# Patient Record
Sex: MALE | Race: WHITE | NOT HISPANIC OR LATINO | Employment: FULL TIME | URBAN - METROPOLITAN AREA
[De-identification: names, ages, dates, MRNs, and addresses within clinical notes are randomized per-mention and may not be internally consistent; named-entity substitution may affect disease eponyms.]

---

## 2017-10-18 ENCOUNTER — HOSPITAL ENCOUNTER (EMERGENCY)
Facility: HOSPITAL | Age: 27
Discharge: HOME | End: 2017-10-18
Attending: EMERGENCY MEDICINE
Payer: MEDICARE

## 2017-10-18 ENCOUNTER — TELEPHONE (OUTPATIENT)
Dept: ORTHOPEDICS | Facility: CLINIC | Age: 27
End: 2017-10-18

## 2017-10-18 VITALS
TEMPERATURE: 98 F | SYSTOLIC BLOOD PRESSURE: 109 MMHG | BODY MASS INDEX: 19.89 KG/M2 | DIASTOLIC BLOOD PRESSURE: 62 MMHG | WEIGHT: 155 LBS | OXYGEN SATURATION: 100 % | HEIGHT: 74 IN | HEART RATE: 68 BPM | RESPIRATION RATE: 16 BRPM

## 2017-10-18 DIAGNOSIS — T78.40XA ALLERGIC REACTION, INITIAL ENCOUNTER: ICD-10-CM

## 2017-10-18 DIAGNOSIS — Z01.811 PRE-OP CHEST EXAM: ICD-10-CM

## 2017-10-18 DIAGNOSIS — S62.92XA CLOSED FRACTURE OF LEFT HAND, INITIAL ENCOUNTER: ICD-10-CM

## 2017-10-18 DIAGNOSIS — R21 RASH: Primary | ICD-10-CM

## 2017-10-18 LAB
ALBUMIN SERPL BCP-MCNC: 4.4 G/DL
ALP SERPL-CCNC: 74 U/L
ALT SERPL W/O P-5'-P-CCNC: 16 U/L
ANION GAP SERPL CALC-SCNC: 12 MMOL/L
AST SERPL-CCNC: 14 U/L
BASOPHILS # BLD AUTO: 0.06 K/UL
BASOPHILS NFR BLD: 0.6 %
BILIRUB SERPL-MCNC: 1.6 MG/DL
BILIRUB UR QL STRIP: NEGATIVE
BUN SERPL-MCNC: 12 MG/DL
CALCIUM SERPL-MCNC: 10.7 MG/DL
CHLORIDE SERPL-SCNC: 103 MMOL/L
CLARITY UR REFRACT.AUTO: CLEAR
CO2 SERPL-SCNC: 25 MMOL/L
COLOR UR AUTO: NORMAL
CREAT SERPL-MCNC: 0.9 MG/DL
DIFFERENTIAL METHOD: ABNORMAL
EOSINOPHIL # BLD AUTO: 0.3 K/UL
EOSINOPHIL NFR BLD: 3.2 %
ERYTHROCYTE [DISTWIDTH] IN BLOOD BY AUTOMATED COUNT: 12.5 %
EST. GFR  (AFRICAN AMERICAN): >60 ML/MIN/1.73 M^2
EST. GFR  (NON AFRICAN AMERICAN): >60 ML/MIN/1.73 M^2
GLUCOSE SERPL-MCNC: 110 MG/DL
GLUCOSE UR QL STRIP: NEGATIVE
HCT VFR BLD AUTO: 42.6 %
HGB BLD-MCNC: 15.3 G/DL
HGB UR QL STRIP: NEGATIVE
IMM GRANULOCYTES # BLD AUTO: 0.01 K/UL
IMM GRANULOCYTES NFR BLD AUTO: 0.1 %
KETONES UR QL STRIP: NEGATIVE
LEUKOCYTE ESTERASE UR QL STRIP: NEGATIVE
LYMPHOCYTES # BLD AUTO: 2.4 K/UL
LYMPHOCYTES NFR BLD: 22.5 %
MCH RBC QN AUTO: 31.3 PG
MCHC RBC AUTO-ENTMCNC: 35.9 G/DL
MCV RBC AUTO: 87 FL
MONOCYTES # BLD AUTO: 0.9 K/UL
MONOCYTES NFR BLD: 8.8 %
NEUTROPHILS # BLD AUTO: 6.8 K/UL
NEUTROPHILS NFR BLD: 64.8 %
NITRITE UR QL STRIP: NEGATIVE
NRBC BLD-RTO: 0 /100 WBC
PH UR STRIP: 8 [PH] (ref 5–8)
PLATELET # BLD AUTO: 272 K/UL
PMV BLD AUTO: 9.2 FL
POTASSIUM SERPL-SCNC: 4 MMOL/L
PROT SERPL-MCNC: 8.6 G/DL
PROT UR QL STRIP: NEGATIVE
RBC # BLD AUTO: 4.89 M/UL
SODIUM SERPL-SCNC: 140 MMOL/L
SP GR UR STRIP: 1.01 (ref 1–1.03)
URN SPEC COLLECT METH UR: NORMAL
UROBILINOGEN UR STRIP-ACNC: NEGATIVE EU/DL
WBC # BLD AUTO: 10.54 K/UL

## 2017-10-18 PROCEDURE — 81003 URINALYSIS AUTO W/O SCOPE: CPT

## 2017-10-18 PROCEDURE — 93010 ELECTROCARDIOGRAM REPORT: CPT | Mod: ,,, | Performed by: INTERNAL MEDICINE

## 2017-10-18 PROCEDURE — 99285 EMERGENCY DEPT VISIT HI MDM: CPT | Mod: ,,, | Performed by: EMERGENCY MEDICINE

## 2017-10-18 PROCEDURE — 63600175 PHARM REV CODE 636 W HCPCS: Performed by: EMERGENCY MEDICINE

## 2017-10-18 PROCEDURE — 25000003 PHARM REV CODE 250: Performed by: ORTHOPAEDIC SURGERY

## 2017-10-18 PROCEDURE — 96372 THER/PROPH/DIAG INJ SC/IM: CPT

## 2017-10-18 PROCEDURE — 99284 EMERGENCY DEPT VISIT MOD MDM: CPT | Mod: 25

## 2017-10-18 PROCEDURE — 93005 ELECTROCARDIOGRAM TRACING: CPT

## 2017-10-18 PROCEDURE — 29125 APPL SHORT ARM SPLINT STATIC: CPT

## 2017-10-18 PROCEDURE — 25000003 PHARM REV CODE 250: Performed by: EMERGENCY MEDICINE

## 2017-10-18 PROCEDURE — 85025 COMPLETE CBC W/AUTO DIFF WBC: CPT

## 2017-10-18 PROCEDURE — 80053 COMPREHEN METABOLIC PANEL: CPT

## 2017-10-18 RX ORDER — DIPHENHYDRAMINE HCL 25 MG
25 CAPSULE ORAL
Status: COMPLETED | OUTPATIENT
Start: 2017-10-18 | End: 2017-10-18

## 2017-10-18 RX ORDER — IBUPROFEN 600 MG/1
600 TABLET ORAL EVERY 6 HOURS PRN
Qty: 25 TABLET | Refills: 0 | Status: SHIPPED | OUTPATIENT
Start: 2017-10-18 | End: 2018-07-18

## 2017-10-18 RX ORDER — DIPHENHYDRAMINE HCL 25 MG
25 CAPSULE ORAL EVERY 6 HOURS PRN
Qty: 20 CAPSULE | Refills: 0 | Status: SHIPPED | OUTPATIENT
Start: 2017-10-18 | End: 2017-12-08

## 2017-10-18 RX ORDER — TRAMADOL HYDROCHLORIDE 50 MG/1
50 TABLET ORAL EVERY 6 HOURS PRN
Qty: 15 TABLET | Refills: 0 | Status: SHIPPED | OUTPATIENT
Start: 2017-10-18 | End: 2017-10-28

## 2017-10-18 RX ORDER — METHYLPREDNISOLONE 4 MG/1
TABLET ORAL
Qty: 1 PACKAGE | Refills: 0 | Status: SHIPPED | OUTPATIENT
Start: 2017-10-18 | End: 2017-11-08

## 2017-10-18 RX ORDER — TRIAMCINOLONE ACETONIDE 40 MG/ML
40 INJECTION, SUSPENSION INTRA-ARTICULAR; INTRAMUSCULAR
Status: COMPLETED | OUTPATIENT
Start: 2017-10-18 | End: 2017-10-18

## 2017-10-18 RX ORDER — LIDOCAINE HYDROCHLORIDE 10 MG/ML
20 INJECTION INFILTRATION; PERINEURAL ONCE
Status: COMPLETED | OUTPATIENT
Start: 2017-10-18 | End: 2017-10-18

## 2017-10-18 RX ORDER — TRAMADOL HYDROCHLORIDE 50 MG/1
50 TABLET ORAL
Status: COMPLETED | OUTPATIENT
Start: 2017-10-18 | End: 2017-10-18

## 2017-10-18 RX ADMIN — TRIAMCINOLONE ACETONIDE 40 MG: 40 INJECTION, SUSPENSION INTRA-ARTICULAR; INTRAMUSCULAR at 03:10

## 2017-10-18 RX ADMIN — TRAMADOL HYDROCHLORIDE 50 MG: 50 TABLET, FILM COATED ORAL at 06:10

## 2017-10-18 RX ADMIN — DIPHENHYDRAMINE HYDROCHLORIDE 25 MG: 25 CAPSULE ORAL at 03:10

## 2017-10-18 RX ADMIN — LIDOCAINE HYDROCHLORIDE 20 ML: 10 INJECTION, SOLUTION INFILTRATION; PERINEURAL at 06:10

## 2017-10-18 NOTE — TELEPHONE ENCOUNTER
----- Message from Heidi Lay LPN sent at 10/18/2017  8:20 AM CDT -----      ----- Message -----  From: Summer Goldman MD  Sent: 10/18/2017   8:07 AM  To: Heidi Lay LPN        ----- Message -----  From: Dayami Sullivan MD  Sent: 10/18/2017   8:01 AM  To: Susi GONZALES Staff    Pt needs f/u with Dr. German tomorrow for pre-op for left base of 4th  fx and hamate fx. Thanks!

## 2017-10-18 NOTE — ED PROVIDER NOTES
Encounter Date: 10/18/2017    SCRIBE #1 NOTE: I, Romulo Rodriguez, am scribing for, and in the presence of,  Dr. Sears. I have scribed the following portions of the note - the Resident attestation.       History     Chief Complaint   Patient presents with    Rash     Pt reports having used new soap and having reaction to groin and buttocks.     Hand Injury     Pt reports having skateboarding accident today and hitting hand. Swelling and bruising noted     HPI   The pt is a 26 yo M w/ PMHx of depression, bipolar disease, anxiety, and PTSD who presents to the ED with complaints of bilateral hand pain and swelling and scrotal and buttocks rash. The pt states he fell at 2 pm yesterday while skateboarding on both hands. He landed on his knuckles per pt. His hand is swollen and bruised L>R. The pt's main complaint is his scrotal, penile shaft and buttocks rash. The pt states he is living multiple places, not homeless, but someone else is doing his laundry. On Saturday he had someone wash his underwear and shortly after experienced dry, itching and swelling of his penile shaft and scrotum. Starting yesterday he put on the same underwear and began to experience a bilateral buttock rash. ROS positive for scrotal and penile burning and itching but otherwise ROS negative.     Review of patient's allergies indicates:   Allergen Reactions    Lithium analogues Hives    Quetiapine Shortness Of Breath and Swelling     Other reaction(s): Hives    Latex      Other reaction(s): Hives    Strattera  [atomoxetine]      Other reaction(s): Hives     Past Medical History:   Diagnosis Date    Anxiety     Behavioral problem     Bipolar 1 disorder     Depression     History of psychiatric hospitalization     2 times    Hx of psychiatric care     PTSD (post-traumatic stress disorder)     Therapy      History reviewed. No pertinent surgical history.  Family History   Problem Relation Age of Onset    Adopted: Yes    Bipolar disorder  Mother      Social History   Substance Use Topics    Smoking status: Current Every Day Smoker     Packs/day: 1.00     Types: Cigarettes    Smokeless tobacco: Never Used    Alcohol use Yes      Comment: ocaasionally     Review of Systems   Constitutional: Negative.    HENT: Negative.    Eyes: Negative.    Respiratory: Negative.    Cardiovascular: Negative.    Gastrointestinal: Negative.    Endocrine: Negative.    Genitourinary: Negative.    Musculoskeletal: Negative.    Skin: Positive for color change and rash.   Allergic/Immunologic: Negative.    Neurological: Negative.    Hematological: Negative.    Psychiatric/Behavioral: The patient is hyperactive.        Physical Exam     Initial Vitals [10/18/17 0146]   BP Pulse Resp Temp SpO2   133/78 83 18 98.4 °F (36.9 °C) 100 %      MAP       96.33         Physical Exam    Constitutional: He appears well-developed and well-nourished. He is not diaphoretic. No distress.   HENT:   Head: Normocephalic and atraumatic.   Mouth/Throat: No oropharyngeal exudate.   Eyes: EOM are normal. Pupils are equal, round, and reactive to light.   Neck: Normal range of motion. No thyromegaly present. No tracheal deviation present. No JVD present.   Cardiovascular: Normal rate, regular rhythm and normal heart sounds. Exam reveals no gallop and no friction rub.    No murmur heard.  Pulmonary/Chest: Breath sounds normal. No stridor. No respiratory distress. He has no wheezes. He has no rhonchi. He has no rales. He exhibits no tenderness.   Abdominal: Soft. Bowel sounds are normal. He exhibits no distension and no mass. There is no tenderness. There is no rebound and no guarding.   Genitourinary:   Genitourinary Comments: Penile erythema and swelling, scrotal skin very dry, rash to bilateral buttock and perineum.    Musculoskeletal: Normal range of motion. He exhibits edema and tenderness.   L >R TTP with swelling and bruising    Lymphadenopathy:     He has no cervical adenopathy.    Neurological: He is alert and oriented to person, place, and time. He has normal strength. He displays normal reflexes. No cranial nerve deficit or sensory deficit.   Skin: Skin is warm. Rash noted. There is erythema.         ED Course   Procedures  Labs Reviewed   URINALYSIS, REFLEX TO URINE CULTURE    Narrative:     Preferred Collection Type->Urine, Clean Catch   CBC W/ AUTO DIFFERENTIAL   COMPREHENSIVE METABOLIC PANEL          X-Rays:   Independently Interpreted Readings:   Other Readings:  1. Comminuted fracture at the base of the 4th metacarpal with suspected intra-articular extension. Probable left hamate fracture.      2. No acute bony abnormality involving the right hand.      Electronically signed by: Patrick Poolegallito  Date: 10/18/17  Time: 03:21     Medical Decision Making:   History:   Old Medical Records: I decided to obtain old medical records.  Initial Assessment:   The pt is a 26 yo male who presents with scrotal and penile erythema and swelling and buttock erythema. Pt also with left hand swelling > R hand swelling.   Differential Diagnosis:   Contact dermatitis, infectious cellulitis, eczema, STI, bony fracture to bilateral hands, ligamentous injury,   Clinical Tests:   Lab Tests: Ordered and Reviewed  Radiological Study: Ordered and Reviewed  Medical Tests: Ordered and Reviewed       APC / Resident Notes:   2:48 AM  Case discussed with Dr. Garcia. Plan to obtain bilateral plain films of hands for fracture evaluation. Dr. Garcia to evaluate rash.   Peter Mcconnell MD     3:45 AM  Pt received steroid injection and benadryl in ED. Rash likely non-infectious in etiology related to new detergent for underwear, as skin rash only covers area of underwear. UA ordered and will f/u. Hand plain films above. Will discuss with ortho whether acute intervention is recommended. Discussed with Dr. Garcia.   Peter Mcconnell MD     5:09 AM  Case further discussed with Dr. Garcia. Pt to be evaluated  by ortho with surgical intervention necessary. Ortho to determine if he is to be operated on today or within the week; per pt preference, will be done next week since he has to work today. Will discharge with regimen for likely contact dermatitis, and pain control for fracture.   Peter Mcconnell MD        Scribe Attestation:   Scribe #1: I performed the above scribed service and the documentation accurately describes the services I performed. I attest to the accuracy of the note.    Attending Attestation:   Physician Attestation Statement for Resident:  As the supervising MD   Physician Attestation Statement: I have personally seen and examined this patient.   I agree with the above history. -:   As the supervising MD I agree with the above PE.    As the supervising MD I agree with the above treatment, course, plan, and disposition.   -:     27 year old male s/p fall with left > right hand pain. Xray shows 4th MCP base intraarticular fracture. Ortho consulted and recommended OR repair today but pt cannot take off work today so will schedule as outpatient urgently. Placed in a splint by Ortho. Also with groin rash, likely due to allergic reaction from new laundry detergent when washing underwear. No evidence of cellulitis or tiffany gangrene. Likely contact dermatitis or allergic reaction. Will treat with benadryl and steroids.   I have reviewed and agree with the residents interpretation of the following: lab data, x-rays, CT scans and EKG.  I have reviewed the following: old records at this facility.                    ED Course      Clinical Impression:   The primary encounter diagnosis was Rash. Diagnoses of Closed fracture of left hand, initial encounter and Pre-op chest exam were also pertinent to this visit.                           Dallin Garcia MD  10/18/17 0800

## 2017-10-18 NOTE — ED TRIAGE NOTES
Rashes on his buttocks and both  thigh. itchiness and swelling of his penis 4days. Dry scrotum.Denies discharge.  Left hand pain. Pt states he thinks its broken    LOC: The patient is awake, alert, aware of environment with an appropriate affect. Oriented x4, speaking appropriately  APPEARANCE: Pt resting comfortably, in no acute distress, pt is clean and well groomed, clothing properly fastened  SKIN:The skin is warm and dry, color consistent with ethnicity, patient has normal skin turgor and moist mucus membranes  RESPIRATORY:Airway is open and patent, respirations are spontaneous, patient has a normal effort and rate, no accessory muscle use noted.  CARDIAC: Normal rate and rhythm, no peripheral edema noted, capillary refill < 3 seconds, bilateral radial pulses 2+.  ABDOMEN: Soft, non tender, non distended. Bowel sounds present x 4 quadrants.   NEUROLOGIC: PERRLA, facial expression is symmetrical, patient moving all extremities spontaneously, normal sensation in all extremities when touched with a finger.  Follows all commands appropriately  MUSCULOSKELETAL: Patient moving all extremities spontaneously, no obvious swelling or deformities noted.

## 2017-10-19 ENCOUNTER — TELEPHONE (OUTPATIENT)
Dept: ORTHOPEDICS | Facility: CLINIC | Age: 27
End: 2017-10-19

## 2017-10-19 NOTE — CONSULTS
Orthopaedic Surgery  Consult Note    Carlos Lee  10/18/2017    HPI:    CC: left hand pain    Patient is a RHD 27 y.o. male with PMHx significant for anxiety, bipolar d/o, PTSD, and depression presents with bilateral hand pain L>>R and swelling after falling while skateboarding yesterday afternoon. He reports that he fell forward, landing on the dorsal aspect of his hands. He did not hit his head or sustain any other injuries. He denies numbness or tingling. He has had significant swelling and bruising of the left hand, minimal swelling of the right.     Past Medical History:   Diagnosis Date    Anxiety     Behavioral problem     Bipolar 1 disorder     Depression     History of psychiatric hospitalization     2 times    Hx of psychiatric care     PTSD (post-traumatic stress disorder)     Therapy      History reviewed. No pertinent surgical history.  Family History   Problem Relation Age of Onset    Adopted: Yes    Bipolar disorder Mother      Social History     Social History    Marital status:      Spouse name: N/A    Number of children: 0    Years of education: N/A     Occupational History    Not on file.     Social History Main Topics    Smoking status: Current Every Day Smoker     Packs/day: 1.00     Types: Cigarettes    Smokeless tobacco: Never Used    Alcohol use Yes      Comment: ocaasionally    Drug use: No    Sexual activity: Not on file     Other Topics Concern    Not on file     Social History Narrative    No narrative on file     No current facility-administered medications on file prior to encounter.      Current Outpatient Prescriptions on File Prior to Encounter   Medication Sig    duloxetine (CYMBALTA) 30 MG capsule Take 1 capsule (30 mg total) by mouth once daily.    hydrOXYzine pamoate (VISTARIL) 25 MG Cap Take 1-2 capsules (25-50 mg total) by mouth every 8 (eight) hours as needed (anxiety).    hyoscyamine (LEVSIN/SL) 0.125 mg Subl Place 1 tablet (0.125 mg  total) under the tongue every 4 (four) hours as needed (abdominal cramps).    mirtazapine (REMERON SOLTAB) 30 MG disintegrating tablet Take 1 tablet (30 mg total) by mouth every evening.    olanzapine (ZYPREXA) 20 MG tablet Take 1.5 tablets (30 mg total) by mouth nightly.    omeprazole (PRILOSEC) 20 MG capsule Take 1 capsule (20 mg total) by mouth once daily.    ondansetron (ZOFRAN-ODT) 8 MG TbDL Take 1 tablet (8 mg total) by mouth every 8 (eight) hours as needed (nausea and vomiting).       Review of Systems:    Patient denies constitutional symptoms, cardiac symptoms, respiratory symptoms, GI symptoms, psychiatric symptoms, endocrine related symptoms.  The remainder of the musculoskeletal ROS is included in the HPI.    Physical Exam:    Temp:  [98.4 °F (36.9 °C)] 98.4 °F (36.9 °C)  Pulse:  [68-83] 68  Resp:  [16-18] 16  SpO2:  [100 %] 100 %  BP: (109-133)/(62-78) 109/62    PE:    AA&O x 4.  NAD  HEENT:  NCAT, sclera nonicteric  Lungs:  Respirations are equal and unlabored.  CV:  2+ bilateral upper and lower extremity pulses.  Skin:  Intact throughout.    MSK:  LUE: Two small shallow abrasions between 3rd and 4th MCP joints, moderate ulnar palmar ecchymosis, significant swelling of the ulnar aspect of the hand; TTP around the base of the 4th MC and the hamate; SILT throughout; grossly motor intact AIN/PIN/U/R nerves; brisk cap refill, warm hand, 2+ DR pulse    RUE: Scattered small abrasions over knuckles; mild TTP over MCP joints; SILT throughout; grossly motor intact AIN/PIN/U/R nerves; brisk cap refill, warm hand, 2+ DR pulse    Diagnostic Results:  Radiographs and CT of left hand show intraarticular, comminuted base of 4th MC fracture and hamate body fracture; no fractures of right hand    A/P:  27 y.o. male with closed left intraarticular, comminuted base of 4th MC fracture and hamate body fracture  - Abrasions cleaned with betadine  - Placed in ulnar gutter splint  - NWB to LUE, pt encouraged to keep  extremity iced and elevated at all times in sling  - Pt will need operative fixation of this fracture. Pt will be contacted by orthopedic hand clinic for f/u this week    Dayami Sullivan MD PGY-2  Orthopaedic Surgery Resident

## 2017-10-20 ENCOUNTER — DOCUMENTATION ONLY (OUTPATIENT)
Dept: ORTHOPEDICS | Facility: CLINIC | Age: 27
End: 2017-10-20

## 2017-12-08 ENCOUNTER — OFFICE VISIT (OUTPATIENT)
Dept: PSYCHIATRY | Facility: CLINIC | Age: 27
End: 2017-12-08
Payer: MEDICARE

## 2017-12-08 VITALS — WEIGHT: 165.56 LBS | HEIGHT: 74 IN | BODY MASS INDEX: 21.25 KG/M2

## 2017-12-08 DIAGNOSIS — F19.20 POLYSUBSTANCE DEPENDENCE INCLUDING OPIOID TYPE DRUG, CONTINUOUS USE: ICD-10-CM

## 2017-12-08 DIAGNOSIS — F11.20 POLYSUBSTANCE DEPENDENCE INCLUDING OPIOID TYPE DRUG, CONTINUOUS USE: ICD-10-CM

## 2017-12-08 DIAGNOSIS — F31.61 BIPOLAR 1 DISORDER, MIXED, MILD: Primary | ICD-10-CM

## 2017-12-08 PROCEDURE — 99999 PR PBB SHADOW E&M-EST. PATIENT-LVL III: CPT | Mod: PBBFAC,,, | Performed by: PSYCHIATRY & NEUROLOGY

## 2017-12-08 PROCEDURE — 99214 OFFICE O/P EST MOD 30 MIN: CPT | Mod: S$GLB,,, | Performed by: PSYCHIATRY & NEUROLOGY

## 2017-12-08 PROCEDURE — 99213 OFFICE O/P EST LOW 20 MIN: CPT | Mod: PBBFAC | Performed by: PSYCHIATRY & NEUROLOGY

## 2017-12-08 RX ORDER — HYDROXYZINE PAMOATE 50 MG/1
50-100 CAPSULE ORAL EVERY 8 HOURS PRN
Qty: 120 CAPSULE | Refills: 5 | Status: SHIPPED | OUTPATIENT
Start: 2017-12-08 | End: 2018-11-26 | Stop reason: SDUPTHER

## 2017-12-08 RX ORDER — OLANZAPINE 20 MG/1
30 TABLET ORAL NIGHTLY
Qty: 135 TABLET | Refills: 1 | Status: SHIPPED | OUTPATIENT
Start: 2017-12-08 | End: 2018-07-16 | Stop reason: SDUPTHER

## 2017-12-08 RX ORDER — MIRTAZAPINE 30 MG/1
30 TABLET, ORALLY DISINTEGRATING ORAL NIGHTLY
Qty: 90 TABLET | Refills: 1 | Status: SHIPPED | OUTPATIENT
Start: 2017-12-08 | End: 2018-07-16 | Stop reason: SDUPTHER

## 2017-12-08 RX ORDER — DIVALPROEX SODIUM 500 MG/1
500 TABLET, DELAYED RELEASE ORAL 2 TIMES DAILY
Qty: 180 TABLET | Refills: 1 | Status: SHIPPED | OUTPATIENT
Start: 2017-12-08 | End: 2018-07-16 | Stop reason: SDUPTHER

## 2017-12-08 NOTE — PROGRESS NOTES
"Outpatient Psychiatry Follow-Up Visit (MD/NP)    12/8/2017    Clinical Status of Patient:  Outpatient (Ambulatory)    Chief Complaint:  Carlos Lee is a 27 y.o. male who presents today for follow-up of mood disorder and psychosis.  Met with patient.      Interval History and Content of Current Session:  Interim Events/Subjective Report/Content of Current Session: Glo Lee presents to clinic for follow up after a long hiatus.  He is with a new woman since last visit and is  now.  Has a 6 month old with the new fiancee.  Today, he appears high and elevated in mood.  Denies being on any drugs at first but then says that he has recently been using heroin and crack cocaine and drinking alcohol daily.  Says he has been clean for a few weeks.  Has been out of medications for almost a year.  Says that he is depressed, agitated, and anxious.  Was also recently homeless until taken in by a girl he calls "sister" but is not related.  At last visit, we tried Cymbalta and he says it helped, except it "made people sound like robots".  Sleeping but not good.  Only eating small bites every few days and is losing a lot of weight.  No friends.  No job.  Only income is his disability.  Parents have also come back into his life after 22 years which has been stressful.  Would like to get back on meds to calm his anxiety and agitation.    Previous medications include taking Abilify, Celexa, Remeron, Depakote, Wellbutrin, Seroquel (very bad reaction - hospitalized medically), Zyprexa, Cymbalta, Invega.    Psychotherapy:  · Target symptoms: depression, lack of focus, mood disorder, psychosis  · Why chosen therapy is appropriate versus another modality: patient responds to this modality  · Outcome monitoring methods: self-report, observation  · Therapeutic intervention type: supportive psychotherapy  · Topics discussed/themes: relationships difficulties, work stress, illness/death of a loved one, stress related to " "medical comorbidities, building skills sets for symptom management, symptom recognition, financial stressors, substance abuse  · The patient's response to the intervention is accepting. The patient's progress toward treatment goals is limited.   · Duration of intervention: 15 minutes.    Review of Systems   · PSYCHIATRIC: Pertinant items are noted in the narrative.  · CONSTITUTIONAL: No weight gain or loss.   · MUSCULOSKELETAL: No pain or stiffness of the joints.  · NEUROLOGIC: No weakness, sensory changes, seizures, confusion, memory loss, tremor or other abnormal movements.  · RESPIRATORY: No shortness of breath.  · CARDIOVASCULAR: No tachycardia or chest pain.  · GASTROINTESTINAL: No nausea, vomiting, pain, constipation or diarrhea.    Past Medical, Family and Social History: The patient's past medical, family and social history have been reviewed and updated as appropriate within the electronic medical record - see encounter notes.    Compliance: questionable    Side effects: None    Risk Parameters:  Patient reports no suicidal ideation  Patient reports no homicidal ideation  Patient reports no self-injurious behavior  Patient reports no violent behavior    Exam (detailed: at least 9 elements; comprehensive: all 15 elements)   Constitutional  Vitals:  Most recent vital signs, dated less than 90 days prior to this appointment, were reviewed.   Vitals:    12/08/17 0749   Weight: 75.1 kg (165 lb 9.1 oz)   Height: 6' 2" (1.88 m)        General:  age appropriate, many tattoos, piercings, unkept     Musculoskeletal  Muscle Strength/Tone:  no tremor, no tic   Gait & Station:  non-ataxic     Psychiatric  Speech:  no latency; no press   Mood & Affect:  anxious, depressed  expansive   Thought Process:  normal and logical   Associations:  intact, circumstantial   Thought Content:  normal, no suicidality, no homicidality, delusions, or paranoia   Insight:  has awareness of illness   Judgement: limited   Orientation:  " person, place, situation, time/date   Memory: intact for content of interview   Language: grossly intact, able to name, able to repeat   Attention Span & Concentration:  able to focus   Fund of Knowledge:  intact and appropriate to age and level of education     Assessment and Diagnosis   Status/Progress: Based on the examination today, the patient's problem(s) is/are adequately but not ideally controlled.  New problems have been presented today.   Co-morbidities and Lack of compliance are complicating management of the primary condition.  There are no active rule-out diagnoses for this patient at this time.     General Impression: We will continue pharmacological intervention and adjunctive therapy.       ICD-10-CM ICD-9-CM   1. Bipolar 1 disorder, mixed, mild F31.61 296.61   2. Polysubstance dependence including opioid type drug, continuous use F11.20 304.71    F19.20        Intervention/Counseling/Treatment Plan   · Medication Management: Continue current medications. The risks and benefits of medication were discussed with the patient.  · Counseling provided with patient as follows: importance of compliance with chosen treatment options was emphasized, risks and benefits of treatment options, including medications, were discussed with the patient, risk factor reduction, prognosis, patient education, instructions for  management, treatment and follow-up were reviewed  1.  Restart Zyprexa 30mg nightly targeting mood stabilization.  Warned of risk of TD, EPS, metabolic syndrome.  2.  Start Depakote 500mg BID targeting mood stabilization.  Warned of need to follow lab values.  3.  Restart Remeron 30mg (sol-tab) nightly targeting depression and sleep.  Warned of risk of kendrick, suicidality, serotonin syndrome.  He has tried this medication in the past but is willing to try again.  Monitor for kendrick.  4.  Restart hydroxyzine 50-100mg every 8 hours PRN anxiety.  Warned of risk of oversedation.  5.  Counseled on the  negative aspects of drugs or alcohol.  Counseled on good behaviors and thinking before acting.  Suspect that he is using drugs or supplements.  Will not provide him with controlled substances.  6.  Told patient to get back into therapy for his psychosocial stressors.  This would also be provided in rehab.  7.  Told patient to return to the ED should he have any worsening of symptoms.      Return to Clinic: 3 months, as needed

## 2017-12-08 NOTE — PATIENT INSTRUCTIONS
"        You have been provided with a certain amount of medication with a specified number of refills.  Please follow up within an adequate time before you run out of medications.    REFILLS FOR CONTROLLED SUBSTANCES WILL NOT BE GIVEN WITHOUT AN APPOINTMENT.  I will not honor or fill automated refill requests from pharmacies.  You must come in for an appointment to get refills.    Please book your next appointment for myself or therapist by phone: [phone].        PLEASE BE AT LEAST 15 MINUTES EARLY FOR YOUR NEXT APPOINTMENT.  PLEASE, DO NOT BE LATE OR YOU WILL BE TURNED AWAY AND ASKED TO RESCHEDULE.  YOU MUST ALLOW TIME FOR CHECK-IN AS WELL AS GET YOUR VITAL SIGNS AND GO OVER YOUR MEDICATIONS.  Tardiness can affect and is not fair to the patients who present after you and are on time for their appointments.  It causes a delay in the appointments for patients and staff.  THERE IS A POSSIBILITY THAT YOU WILL BE CHARGED FOR THE APPOINTMENT TIME PERSONALLY AND IT WILL NOT GO TO YOUR INSURANCE.  YOU MAY ALSO BE DISCHARGED FROM CLINIC with multiple "No Show" appointments.       -----------------------------------------------------------------------------------------------------------------  IF YOU FEEL SUICIDAL OR HAVING THOUGHTS OR PLANS TO HURT YOURSELF OR OTHERS, CALL 911 OR REPORT TO THE NEAREST EMERGENCY ROOM.  YOU CAN ALSO ACCESS ONE OF THE FOLLOWING HOTLINES:    [facility]  [facility]  [phone]    [place]   [facility]   [phone]     [facility]  24 hours / 7 days   [phone]       "

## 2018-07-11 RX ORDER — MIRTAZAPINE 30 MG/1
TABLET, FILM COATED ORAL
Qty: 90 TABLET | Refills: 0 | OUTPATIENT
Start: 2018-07-11

## 2018-07-11 NOTE — TELEPHONE ENCOUNTER
Spoke to patient, appointment scheduled and notified patient that I will send Rx refill requests to Dr Egan.

## 2018-07-11 NOTE — TELEPHONE ENCOUNTER
----- Message from Jaquelin Woodward sent at 7/11/2018  8:50 AM CDT -----  Contact: Carlos Lee  Pt called regarding medication and scheduling an appt       [phone]

## 2018-07-16 ENCOUNTER — TELEPHONE (OUTPATIENT)
Dept: PSYCHIATRY | Facility: HOSPITAL | Age: 28
End: 2018-07-16

## 2018-07-16 DIAGNOSIS — F31.61 BIPOLAR 1 DISORDER, MIXED, MILD: ICD-10-CM

## 2018-07-16 RX ORDER — OLANZAPINE 20 MG/1
30 TABLET ORAL NIGHTLY
Qty: 30 TABLET | Refills: 0 | Status: SHIPPED | OUTPATIENT
Start: 2018-07-16 | End: 2018-11-26 | Stop reason: SDUPTHER

## 2018-07-16 RX ORDER — DIVALPROEX SODIUM 500 MG/1
500 TABLET, DELAYED RELEASE ORAL 2 TIMES DAILY
Qty: 30 TABLET | Refills: 0 | Status: SHIPPED | OUTPATIENT
Start: 2018-07-16 | End: 2018-11-26 | Stop reason: SDUPTHER

## 2018-07-16 RX ORDER — MIRTAZAPINE 30 MG/1
30 TABLET, ORALLY DISINTEGRATING ORAL NIGHTLY
Qty: 30 TABLET | Refills: 0 | Status: SHIPPED | OUTPATIENT
Start: 2018-07-16 | End: 2018-11-26 | Stop reason: SDUPTHER

## 2018-07-16 NOTE — TELEPHONE ENCOUNTER
"----- Message from Saloni Childers MA sent at 7/11/2018  1:55 PM CDT -----  Contact: Patient      ----- Message -----  From: Gautam Garcia MD  Sent: 7/11/2018   1:48 PM  To: Saloni Childers MA    Yes, he will need to speak with Dr. Egan first before any refills are potentially issued.  He should call back Monday when Dr. Egan returns.  Thanks.    ----- Message -----  From: Saloni Childers MA  Sent: 7/11/2018  12:49 PM  To: Jerel Egan MD    Patient called for refills of Zyprexa, Remeron and Depakote. He has not been seen in 6 months but did schedule follow up. He had many excuses as to why he did not keep previous follow up. "I was incarcerated, I was homeless, didn't have a cell phone." He said that there is a court order, "not on paper", that I take these medications. He was willing to schedule follow up in hopes of Rx's being filled before his appointment. I explained that Dr Egan is on vacation and the Dr segura may not be able to refill because he needs follow up. He said, "well I have to get these Rx's because they're going to do drug test and I'm supposed to be taking them. Even if he can do 15-30 days worth" I let him know that I would pass information to provider, but that he would likely need to be seen for his scheduled follow up.     "

## 2018-07-18 ENCOUNTER — HOSPITAL ENCOUNTER (EMERGENCY)
Facility: HOSPITAL | Age: 28
Discharge: HOME | End: 2018-07-18
Attending: EMERGENCY MEDICINE
Payer: MEDICARE

## 2018-07-18 VITALS
TEMPERATURE: 98 F | RESPIRATION RATE: 16 BRPM | BODY MASS INDEX: 21.17 KG/M2 | HEIGHT: 74 IN | WEIGHT: 165 LBS | DIASTOLIC BLOOD PRESSURE: 74 MMHG | OXYGEN SATURATION: 95 % | SYSTOLIC BLOOD PRESSURE: 132 MMHG | HEART RATE: 84 BPM

## 2018-07-18 DIAGNOSIS — S61.211A LACERATION OF LEFT INDEX FINGER WITHOUT FOREIGN BODY WITHOUT DAMAGE TO NAIL, INITIAL ENCOUNTER: Primary | ICD-10-CM

## 2018-07-18 PROCEDURE — 25000003 PHARM REV CODE 250: Performed by: PHYSICIAN ASSISTANT

## 2018-07-18 PROCEDURE — 90715 TDAP VACCINE 7 YRS/> IM: CPT | Performed by: PHYSICIAN ASSISTANT

## 2018-07-18 PROCEDURE — 29131 APPL FINGER SPLINT DYNAMIC: CPT | Mod: F1

## 2018-07-18 PROCEDURE — 99283 EMERGENCY DEPT VISIT LOW MDM: CPT | Mod: 25

## 2018-07-18 PROCEDURE — 12041 INTMD RPR N-HF/GENIT 2.5CM/<: CPT | Mod: F1,59

## 2018-07-18 PROCEDURE — 90471 IMMUNIZATION ADMIN: CPT | Performed by: PHYSICIAN ASSISTANT

## 2018-07-18 PROCEDURE — 63600175 PHARM REV CODE 636 W HCPCS: Performed by: PHYSICIAN ASSISTANT

## 2018-07-18 RX ORDER — SULFAMETHOXAZOLE AND TRIMETHOPRIM 800; 160 MG/1; MG/1
1 TABLET ORAL 2 TIMES DAILY
Qty: 20 TABLET | Refills: 0 | Status: SHIPPED | OUTPATIENT
Start: 2018-07-18 | End: 2018-07-28

## 2018-07-18 RX ORDER — MELOXICAM 7.5 MG/1
7.5 TABLET ORAL DAILY
Qty: 12 TABLET | Refills: 0 | Status: SHIPPED | OUTPATIENT
Start: 2018-07-18 | End: 2018-07-18 | Stop reason: CLARIF

## 2018-07-18 RX ORDER — LIDOCAINE HYDROCHLORIDE 10 MG/ML
10 INJECTION INFILTRATION; PERINEURAL
Status: COMPLETED | OUTPATIENT
Start: 2018-07-18 | End: 2018-07-18

## 2018-07-18 RX ORDER — SULFAMETHOXAZOLE AND TRIMETHOPRIM 800; 160 MG/1; MG/1
1 TABLET ORAL
Status: COMPLETED | OUTPATIENT
Start: 2018-07-18 | End: 2018-07-18

## 2018-07-18 RX ORDER — ORPHENADRINE CITRATE 100 MG/1
100 TABLET, EXTENDED RELEASE ORAL 2 TIMES DAILY
Qty: 8 TABLET | Refills: 0 | Status: SHIPPED | OUTPATIENT
Start: 2018-07-18 | End: 2018-07-18 | Stop reason: CLARIF

## 2018-07-18 RX ORDER — IBUPROFEN 600 MG/1
600 TABLET ORAL EVERY 6 HOURS PRN
Qty: 15 TABLET | Refills: 0 | OUTPATIENT
Start: 2018-07-18 | End: 2019-07-10

## 2018-07-18 RX ORDER — ACETAMINOPHEN 325 MG/1
650 TABLET ORAL
Status: COMPLETED | OUTPATIENT
Start: 2018-07-18 | End: 2018-07-18

## 2018-07-18 RX ORDER — LIDOCAINE 50 MG/G
1 PATCH TOPICAL DAILY
Qty: 6 PATCH | Refills: 0 | Status: SHIPPED | OUTPATIENT
Start: 2018-07-18 | End: 2018-07-18 | Stop reason: CLARIF

## 2018-07-18 RX ADMIN — LIDOCAINE HYDROCHLORIDE 10 ML: 10 INJECTION, SOLUTION INFILTRATION; PERINEURAL at 02:07

## 2018-07-18 RX ADMIN — CLOSTRIDIUM TETANI TOXOID ANTIGEN (FORMALDEHYDE INACTIVATED), CORYNEBACTERIUM DIPHTHERIAE TOXOID ANTIGEN (FORMALDEHYDE INACTIVATED), BORDETELLA PERTUSSIS TOXOID ANTIGEN (GLUTARALDEHYDE INACTIVATED), BORDETELLA PERTUSSIS FILAMENTOUS HEMAGGLUTININ ANTIGEN (FORMALDEHYDE INACTIVATED), BORDETELLA PERTUSSIS PERTACTIN ANTIGEN, AND BORDETELLA PERTUSSIS FIMBRIAE 2/3 ANTIGEN 0.5 ML: 5; 2; 2.5; 5; 3; 5 INJECTION, SUSPENSION INTRAMUSCULAR at 01:07

## 2018-07-18 RX ADMIN — SULFAMETHOXAZOLE AND TRIMETHOPRIM 1 TABLET: 800; 160 TABLET ORAL at 02:07

## 2018-07-18 RX ADMIN — ACETAMINOPHEN 650 MG: 325 TABLET, FILM COATED ORAL at 01:07

## 2018-07-18 NOTE — ED PROVIDER NOTES
Encounter Date: 7/18/2018    This is a SORT/MSE of a 27 y.o. male presenting to the ED with c/o laceration to left index finger from . Bleeding controlled. Care will be transferred to an alternate provider when patient is roomed for a full evaluation and final disposition. JOEL Owusu, FNP-C 7/18/2018 12:38 PM    SCRIBE #1 NOTE: I, Refugio Martinez, am scribing for, and in the presence of,  Thompson Vieyra PA-C. I have scribed the following portions of the note - Other sections scribed: HPI, ROS.       History     Chief Complaint   Patient presents with    Laceration     right hand, finger to      CC: Laceration    26 y/o male with anxiety, bipolar 1 disorder, and hx of psychiatric hospitalization and psychiatric care presents to the ED c/o acute onset laceration to L sided index finger after accidentally cutting himself with a motorized  PTA. The patient states that he was trimming a hedge on a ladder, when it started raining causing him to accidentally slip and fall. He states his finger got caught in the  while he was falling. The patient is unsure when his last tetanus was. The patient is ambidextrous. The patient denies any other injuries. The patient denies numbness/tingling, fever, or chills. No other symptoms reported.      The history is provided by the patient. No  was used.     Review of patient's allergies indicates:   Allergen Reactions    Lithium analogues Hives    Quetiapine Shortness Of Breath and Swelling     Other reaction(s): Hives    Latex      Other reaction(s): Hives    Strattera  [atomoxetine]      Other reaction(s): Hives     Past Medical History:   Diagnosis Date    Anxiety     Behavioral problem     Bipolar 1 disorder     Depression     History of psychiatric hospitalization     2 times    Hx of psychiatric care     PTSD (post-traumatic stress disorder)     Therapy      History reviewed. No pertinent  surgical history.  Family History   Problem Relation Age of Onset    Adopted: Yes    Bipolar disorder Mother      Social History   Substance Use Topics    Smoking status: Current Every Day Smoker     Packs/day: 1.00     Types: Cigarettes    Smokeless tobacco: Never Used    Alcohol use No     Review of Systems   Constitutional: Negative for chills and fever.   HENT: Negative for congestion, ear pain, rhinorrhea and sore throat.    Eyes: Negative for redness.   Respiratory: Negative for cough and shortness of breath.    Cardiovascular: Negative for chest pain.   Gastrointestinal: Negative for abdominal pain, diarrhea, nausea and vomiting.   Genitourinary: Negative for decreased urine volume, difficulty urinating, dysuria, frequency, hematuria and urgency.   Musculoskeletal: Negative for back pain and neck pain.   Skin: Negative for rash.        (+) laceration to L index finger   Neurological: Negative for numbness (or tingling) and headaches.       Physical Exam     Initial Vitals [07/18/18 1237]   BP Pulse Resp Temp SpO2   132/74 92 20 98.2 °F (36.8 °C) 95 %      MAP       --         Physical Exam    Nursing note and vitals reviewed.  Constitutional: He appears well-developed and well-nourished. He is not diaphoretic. No distress.   HENT:   Head: Normocephalic and atraumatic.   Nose: Nose normal.   Eyes: Conjunctivae and EOM are normal. Right eye exhibits no discharge. Left eye exhibits no discharge.   Neck: Normal range of motion. No tracheal deviation present. No JVD present.   Cardiovascular: Normal rate, regular rhythm and normal heart sounds. Exam reveals no friction rub.    No murmur heard.  Pulmonary/Chest: Breath sounds normal. No stridor. No respiratory distress. He has no wheezes. He has no rhonchi. He has no rales. He exhibits no tenderness.   Musculoskeletal:   3 separate 0.5cm lacerations to the distal L index finger without active bleeding, foreign body, or bony deformity. Full ROM of digits,  including at the DIP joint. Slightly decreased sensation to ulnar aspect of the finger, but no loss of sensation. Capillary refill less than 2 seconds.    Neurological: He is alert and oriented to person, place, and time.   Skin: Skin is warm and dry. No rash and no abscess noted. No erythema. No pallor.         ED Course   Lac Repair  Date/Time: 7/18/2018 4:28 PM  Performed by: MARV FINNEY  Authorized by: GINO BLOOM   Consent Done: Yes  Consent: Verbal consent obtained.  Consent given by: patient  Location: L index finger.  Laceration length: 1 cm  Foreign bodies: no foreign bodies  Tendon involvement: none  Nerve involvement: superficial  Vascular damage: no  Anesthesia: digital block    Anesthesia:  Local Anesthetic: lidocaine 1% without epinephrine  Anesthetic total: 6 mL  Patient sedated: no  Preparation: Patient was prepped and draped in the usual sterile fashion.  Irrigation solution: saline  Irrigation method: jet lavage  Amount of cleaning: extensive  Debridement: none  Degree of undermining: none  Skin closure: 4-0 nylon  Number of sutures: 8  Technique: simple  Approximation: close  Approximation difficulty: simple  Dressing: antibiotic ointment, dressing applied and splint for protection  Patient tolerance: Patient tolerated the procedure well with no immediate complications        Labs Reviewed - No data to display       Imaging Results    None          Medical Decision Making:   History:   Old Medical Records: I decided to obtain old medical records.      This is an emergent evaluation of a 27 y.o. male with no pertinent PMHx presenting to the ED for a laceration to L index finger. Denies fever and numbness. Vitals WNL, afebrile. Patient is non-toxic appearing and in no acute distress. No bony tenderness. May have partial peripheral nerve injury. No evidence of tendon disruption or ligamentous injury. No evidence for infection at this time.     Laceration will require closure to achieve and  maintain hemostasis and to promote optimal wound healing after the wound is copiously irrigated at high pressure.Tetanus status is updated. Dressed with antibiotic ointment and non-adherent dressing. Discharged with Bactrim and Motrin and instructed to follow up with PCP for reevaluation and suture removal in 7-10 days.       I discussed with the patient the diagnosis, treatment plan, indications for return to the emergency department, and for expected follow-up. The patient verbalized an understanding. The patient is asked if there are any questions or concerns. We discuss the case, until all issues are addressed to the patients satisfaction. Patient understands and is agreeable to the plan.     I discussed this patient with Dr. Rivas who is in agreement with my assessment and plan.               Scribe Attestation:   Scribe #1: I performed the above scribed service and the documentation accurately describes the services I performed. I attest to the accuracy of the note.    Attending Attestation:           Physician Attestation for Scribe:  Physician Attestation Statement for Scribe #1: I, Thompson Vieyra PA-C, reviewed documentation, as scribed by Refugio Martinez in my presence, and it is both accurate and complete.                    Clinical Impression:   The encounter diagnosis was Laceration of left index finger without foreign body without damage to nail, initial encounter.      Disposition:   Disposition: Discharged  Condition: Stable                        Thompson Tavarez PA-C  07/18/18 [id number]

## 2018-11-26 ENCOUNTER — HOSPITAL ENCOUNTER (EMERGENCY)
Facility: HOSPITAL | Age: 28
Discharge: HOME | End: 2018-11-26
Attending: EMERGENCY MEDICINE
Payer: MEDICARE

## 2018-11-26 VITALS
HEIGHT: 74 IN | DIASTOLIC BLOOD PRESSURE: 76 MMHG | WEIGHT: 159 LBS | RESPIRATION RATE: 18 BRPM | SYSTOLIC BLOOD PRESSURE: 121 MMHG | TEMPERATURE: 99 F | HEART RATE: 78 BPM | OXYGEN SATURATION: 98 % | BODY MASS INDEX: 20.41 KG/M2

## 2018-11-26 DIAGNOSIS — F48.9 MENTAL HEALTH PROBLEM: Primary | ICD-10-CM

## 2018-11-26 DIAGNOSIS — F31.61 BIPOLAR 1 DISORDER, MIXED, MILD: ICD-10-CM

## 2018-11-26 PROBLEM — F39 MOOD DISORDER: Status: ACTIVE | Noted: 2018-11-26

## 2018-11-26 PROCEDURE — 99284 EMERGENCY DEPT VISIT MOD MDM: CPT

## 2018-11-26 PROCEDURE — 25000003 PHARM REV CODE 250: Performed by: EMERGENCY MEDICINE

## 2018-11-26 PROCEDURE — 90792 PSYCH DIAG EVAL W/MED SRVCS: CPT | Mod: ,,, | Performed by: PSYCHIATRY & NEUROLOGY

## 2018-11-26 RX ORDER — OLANZAPINE 20 MG/1
30 TABLET ORAL NIGHTLY
Qty: 30 TABLET | Refills: 0 | Status: SHIPPED | OUTPATIENT
Start: 2018-11-26 | End: 2020-07-31 | Stop reason: SDUPTHER

## 2018-11-26 RX ORDER — MIRTAZAPINE 30 MG/1
30 TABLET, ORALLY DISINTEGRATING ORAL NIGHTLY
Qty: 30 TABLET | Refills: 0 | Status: SHIPPED | OUTPATIENT
Start: 2018-11-26 | End: 2020-07-31 | Stop reason: SDUPTHER

## 2018-11-26 RX ORDER — HYDROXYZINE PAMOATE 50 MG/1
50-100 CAPSULE ORAL EVERY 8 HOURS PRN
Qty: 120 CAPSULE | Refills: 5 | Status: SHIPPED | OUTPATIENT
Start: 2018-11-26 | End: 2020-07-31 | Stop reason: SDUPTHER

## 2018-11-26 RX ORDER — DIVALPROEX SODIUM 500 MG/1
500 TABLET, DELAYED RELEASE ORAL 2 TIMES DAILY
Qty: 30 TABLET | Refills: 0 | Status: SHIPPED | OUTPATIENT
Start: 2018-11-26 | End: 2020-07-31 | Stop reason: SDUPTHER

## 2018-11-26 RX ORDER — HYDROXYZINE PAMOATE 25 MG/1
50 CAPSULE ORAL
Status: COMPLETED | OUTPATIENT
Start: 2018-11-26 | End: 2018-11-26

## 2018-11-26 RX ADMIN — HYDROXYZINE PAMOATE 50 MG: 25 CAPSULE ORAL at 08:11
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
## 2018-11-26 NOTE — ED TRIAGE NOTES
"Pt arrived to ED via EMS with suicidal thoughts. Pt reports he has been having trouble with ex wife recently. Pt reports he "was feeding the baby, as usual, and she just started freaking out on me and I couldn't take it anymore." Pt has been non compliant with medications.   "
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
## 2018-11-26 NOTE — DISCHARGE INSTRUCTIONS
PLEASE SET UP AN APPOINTMENT WITH DR. ERNANDEZ AS AN OUTPATIENT AS DISCUSSED.  RETURN FOR NEW OR WORSENING SYMPTOMS SUCH AS ANY THOUGHTS OF HARMING HERSELF OR HARMING OTHERS OR ANY HALLUCINATIONS.  Medications as prescribed.

## 2018-11-26 NOTE — SUBJECTIVE & OBJECTIVE
"     Patient History           Medical as of 11/26/2018     Past Medical History     Diagnosis Date Comments Source    Behavioral problem -- -- Provider    History of psychiatric hospitalization -- 2 times Provider    Hx of psychiatric care -- -- Provider    Psychiatric problem -- -- Provider    Suicide attempt -- cutting wrists Provider    Therapy -- -- Provider          Pertinent Negatives     Diagnosis Date Noted Comments Source    Self-harming behavior 08/04/2015 -- Provider                  Surgical as of 11/26/2018    Past Surgical History: Patient provided no pertinent surgical history.           Family as of 11/26/2018     Problem Relation Name Age of Onset Comments Source    Bipolar disorder Mother -- -- -- Provider            Tobacco Use as of 11/26/2018     Smoking Status Smoking Start Date Smoking Quit Date Packs/Day Years Used    Current Every Day Smoker -- -- 1.00 --    Types Comments Smokeless Tobacco Status Smokeless Tobacco Quit Date Source     Cigarettes packs "vary a day."  Never Used -- Provider            Alcohol Use as of 11/26/2018     Alcohol Use Drinks/Week Alcohol/Week Comments Source    Yes -- -- frequent Provider    Frequency Standard Drinks Binge Drinking Source      -- -- -- Provider             Drug Use as of 11/26/2018     Drug Use Types Frequency Comments Source    Yes  Marijuana, Cocaine, Methamphetamines -- frequent use Provider            Sexual Activity as of 11/26/2018     Sexually Active Birth Control Partners Comments Source    -- -- -- -- Provider            Activities of Daily Living as of 11/26/2018     Activities of Daily Living Question Response Comments Source    Patient feels they ought to cut down on drinking/drug use Not Asked -- Provider    Patient annoyed by others criticizing their drinking/drug use Not Asked -- Provider    Patient has felt bad or guilty about drinking/drug use Not Asked -- Provider    Patient has had a drink/used drugs as an eye opener in the AM " "Not Asked -- Provider            Social Documentation as of 11/26/2018    None           Occupational as of 11/26/2018    None           Socioeconomic as of 11/26/2018     Marital Status Spouse Name Number of Children Years Education Education Level Preferred Language Ethnicity Race Source     -- 1 -- -- English /White White Provider    Financial Resource Strain Food Insecurity: Worry Food Insecurity: Inability Transportation Needs: Medical Transportation Needs: Non-medical       -- -- -- -- --             Pertinent History     Question Response Comments    Lives with friends --    Place in Birth Order -- --    Lives in home --    Number of Siblings -- --    Raised by other grew up in foster homes    Legal Involvement none --    Childhood Trauma early trauma many foster homes    Criminal History of arrest and incarceration did 2 months for felony theft    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 No --     Service No --    Primary Leisure Activity other music; drawing    Spirituality non-spiritual --        Past Medical History:   Diagnosis Date    Behavioral problem     History of psychiatric hospitalization     2 times    Hx of psychiatric care     Psychiatric problem     Suicide attempt     cutting wrists    Therapy      History reviewed. No pertinent surgical history.  Family History     Problem Relation (Age of Onset)    Bipolar disorder Mother        Tobacco Use    Smoking status: Current Every Day Smoker     Packs/day: 1.00     Types: Cigarettes    Smokeless tobacco: Never Used    Tobacco comment: packs "vary a day."    Substance and Sexual Activity    Alcohol use: Yes     Comment: frequent    Drug use: Yes     Types: Marijuana, Cocaine, Methamphetamines     Comment: frequent use    Sexual activity: Not on file     Review of patient's allergies indicates: "   Allergen Reactions    Lithium analogues Hives    Quetiapine Shortness Of Breath and Swelling     Other reaction(s): Hives    Latex      Other reaction(s): Hives    Strattera  [atomoxetine]      Other reaction(s): Hives       No current facility-administered medications on file prior to encounter.      Current Outpatient Medications on File Prior to Encounter   Medication Sig    ibuprofen (ADVIL,MOTRIN) 600 MG tablet Take 1 tablet (600 mg total) by mouth every 6 (six) hours as needed for Pain.    omeprazole (PRILOSEC) 20 MG capsule Take 1 capsule (20 mg total) by mouth once daily.    [DISCONTINUED] divalproex (DEPAKOTE) 500 MG TbEC Take 1 tablet (500 mg total) by mouth 2 (two) times daily.    [DISCONTINUED] hydrOXYzine pamoate (VISTARIL) 50 MG Cap Take 1-2 capsules ( mg total) by mouth every 8 (eight) hours as needed (anxiety).    [DISCONTINUED] mirtazapine (REMERON SOLTAB) 30 MG disintegrating tablet Take 1 tablet (30 mg total) by mouth every evening.    [DISCONTINUED] OLANZapine (ZYPREXA) 20 MG tablet Take 1.5 tablets (30 mg total) by mouth nightly.     Psychotherapeutics (From admission, onward)    None        Review of Systems   Constitutional: Positive for activity change. Negative for fatigue.   Respiratory: Negative for shortness of breath.    Cardiovascular: Negative for chest pain.   Gastrointestinal: Positive for nausea.   Musculoskeletal: Positive for myalgias.   Psychiatric/Behavioral: Positive for sleep disturbance. Negative for decreased concentration, dysphoric mood, hallucinations and suicidal ideas. The patient is nervous/anxious.      Strengths and Liabilities: Strength: Patient is motivated for change., Liability: Patient has poor judgment, Liability: Patient lacks coping skills.    Objective:     Vital Signs (Most Recent):  Temp: 97.9 °F (36.6 °C) (11/26/18 1230)  Pulse: 73 (11/26/18 1230)  Resp: 18 (11/26/18 1230)  BP: 102/69 (11/26/18 1230)  SpO2: 97 % (11/26/18 1230) Vital  "Signs (24h Range):  Temp:  [97.4 °F (36.3 °C)-99.3 °F (37.4 °C)] 97.9 °F (36.6 °C)  Pulse:  [64-81] 73  Resp:  [18] 18  SpO2:  [96 %-100 %] 97 %  BP: (102-120)/(69-80) 102/69     Height: 6' 2" (188 cm)  Weight: 72.1 kg (159 lb)  Body mass index is 20.41 kg/m².    No intake or output data in the 24 hours ending 11/26/18 1356    Physical Exam   Psychiatric:   EXAMINATION    CONSTITUTIONAL  General Appearance: blond dyed hair, multiple tattoos and piercings    MUSCULOSKELETAL  Muscle Strength and Tone: normal  Abnormal Involuntary Movements: none noted  Gait and Station: normal    PSYCHIATRIC MENTAL STATUS EXAM   Level of Consciousness: awake and alert  Orientation: name, place date, situation  Grooming: poor, smells, disheveled  Psychomotor Behavior: normal  Speech: normal rate, tone, volume  Language: no abnormalities  Mood: "anxious"  Affect: normal, at baseline  Thought Process: linear  Associations: intact  Thought Content: denies suicidal/homicidal/psychosis  Memory: intact to recent and remote  Attention: intact  Fund of Knowledge: intact for conversation  Insight: fair into mental illness; poor into drug use  Judgment: poor into compliance with care         Significant Labs:   Last 24 Hours:   Recent Lab Results     None        All pertinent labs within the past 24 hours have been reviewed.    Significant Imaging: I have reviewed all pertinent imaging results/findings within the past 24 hours.  "
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
## 2019-07-10 ENCOUNTER — HOSPITAL ENCOUNTER (EMERGENCY)
Facility: HOSPITAL | Age: 29
Discharge: HOME | End: 2019-07-10
Attending: EMERGENCY MEDICINE
Payer: MEDICARE

## 2019-07-10 VITALS
BODY MASS INDEX: 20.53 KG/M2 | DIASTOLIC BLOOD PRESSURE: 78 MMHG | OXYGEN SATURATION: 100 % | TEMPERATURE: 99 F | SYSTOLIC BLOOD PRESSURE: 123 MMHG | HEIGHT: 74 IN | WEIGHT: 160 LBS | RESPIRATION RATE: 18 BRPM | HEART RATE: 86 BPM

## 2019-07-10 DIAGNOSIS — N39.0 ACUTE URINARY TRACT INFECTION: Primary | ICD-10-CM

## 2019-07-10 LAB
BILIRUBIN, POC UA: NEGATIVE
BLOOD, POC UA: NEGATIVE
CLARITY, POC UA: CLEAR
COLOR, POC UA: ABNORMAL
GLUCOSE, POC UA: NEGATIVE
KETONES, POC UA: NEGATIVE
LEUKOCYTE EST, POC UA: ABNORMAL
NITRITE, POC UA: NEGATIVE
PH UR STRIP: 6.5 [PH]
PROTEIN, POC UA: ABNORMAL
SPECIFIC GRAVITY, POC UA: 1.02
UROBILINOGEN, POC UA: 1 E.U./DL

## 2019-07-10 PROCEDURE — 87491 CHLMYD TRACH DNA AMP PROBE: CPT

## 2019-07-10 PROCEDURE — 99284 EMERGENCY DEPT VISIT MOD MDM: CPT | Mod: 25,ER

## 2019-07-10 PROCEDURE — 25000003 PHARM REV CODE 250: Mod: ER | Performed by: EMERGENCY MEDICINE

## 2019-07-10 PROCEDURE — 96372 THER/PROPH/DIAG INJ SC/IM: CPT | Mod: ER

## 2019-07-10 PROCEDURE — 63600175 PHARM REV CODE 636 W HCPCS: Mod: ER | Performed by: EMERGENCY MEDICINE

## 2019-07-10 PROCEDURE — 81003 URINALYSIS AUTO W/O SCOPE: CPT | Mod: ER

## 2019-07-10 RX ORDER — DOXYCYCLINE 100 MG/1
100 CAPSULE ORAL 2 TIMES DAILY
Qty: 20 CAPSULE | Refills: 0 | Status: SHIPPED | OUTPATIENT
Start: 2019-07-10 | End: 2019-07-20

## 2019-07-10 RX ORDER — CEFTRIAXONE 1 G/1
250 INJECTION, POWDER, FOR SOLUTION INTRAMUSCULAR; INTRAVENOUS
Status: COMPLETED | OUTPATIENT
Start: 2019-07-10 | End: 2019-07-10

## 2019-07-10 RX ORDER — ACETAMINOPHEN 500 MG
1000 TABLET ORAL EVERY 6 HOURS PRN
Qty: 30 TABLET | Refills: 0 | Status: SHIPPED | OUTPATIENT
Start: 2019-07-10 | End: 2020-07-31

## 2019-07-10 RX ORDER — PHENAZOPYRIDINE HYDROCHLORIDE 200 MG/1
200 TABLET, FILM COATED ORAL 3 TIMES DAILY PRN
Qty: 6 TABLET | Refills: 0 | Status: SHIPPED | OUTPATIENT
Start: 2019-07-10 | End: 2019-07-12

## 2019-07-10 RX ORDER — IBUPROFEN 600 MG/1
600 TABLET ORAL EVERY 6 HOURS PRN
Qty: 20 TABLET | Refills: 0 | Status: SHIPPED | OUTPATIENT
Start: 2019-07-10 | End: 2020-07-31

## 2019-07-10 RX ORDER — AZITHROMYCIN 250 MG/1
1000 TABLET, FILM COATED ORAL
Status: COMPLETED | OUTPATIENT
Start: 2019-07-10 | End: 2019-07-10

## 2019-07-10 RX ADMIN — CEFTRIAXONE SODIUM 250 MG: 1 INJECTION, POWDER, FOR SOLUTION INTRAMUSCULAR; INTRAVENOUS at 12:07

## 2019-07-10 RX ADMIN — AZITHROMYCIN MONOHYDRATE 1000 MG: 250 TABLET ORAL at 12:07

## 2019-07-10 NOTE — DISCHARGE INSTRUCTIONS
Please follow up with primary care physician for further testing to include testing for HIV and syphilis.  Use condoms at all times.

## 2019-07-10 NOTE — ED PROVIDER NOTES
"Encounter Date: 7/10/2019    SCRIBE #1 NOTE: I, Susan Bledsoe, am scribing for, and in the presence of,  Dr. Montgomery. I have scribed the following portions of the note - Other sections scribed: HPI, ROS, PE.       History     Chief Complaint   Patient presents with    STD CHECK     Carlos Lee is a 28 y.o. male who presents to the ED complaining of possible exposure to STD from girlfriend, Linda Hart, whom he states is currently being treated for STD.  Patient reports intermittent pain when he pees.  Patient has taken nothing for symptoms. Patient does not use condoms    The history is provided by the patient. No  was used.     Review of patient's allergies indicates:   Allergen Reactions    Lithium analogues Hives    Quetiapine Shortness Of Breath and Swelling     Other reaction(s): Hives    Latex      Other reaction(s): Hives    Strattera  [atomoxetine]      Other reaction(s): Hives     Past Medical History:   Diagnosis Date    Behavioral problem     History of psychiatric hospitalization     2 times    Hx of psychiatric care     Psychiatric problem     Suicide attempt     cutting wrists    Therapy      No past surgical history on file.  Family History   Adopted: Yes   Problem Relation Age of Onset    Bipolar disorder Mother      Social History     Tobacco Use    Smoking status: Current Every Day Smoker     Packs/day: 1.00     Types: Cigarettes    Smokeless tobacco: Never Used    Tobacco comment: packs "vary a day."    Substance Use Topics    Alcohol use: Yes     Comment: frequent    Drug use: Yes     Types: Marijuana, Cocaine, Methamphetamines     Comment: frequent use     Review of Systems   Constitutional: Negative for fever.   Respiratory: Negative for shortness of breath.    Cardiovascular: Negative for chest pain.   Gastrointestinal: Negative for abdominal pain.   Genitourinary: Positive for dysuria. Negative for discharge, penile pain, penile swelling, scrotal " swelling and testicular pain.   All other systems reviewed and are negative.      Physical Exam     Initial Vitals [07/10/19 1204]   BP Pulse Resp Temp SpO2   120/73 78 16 97.8 °F (36.6 °C) 100 %      MAP       --           Patient gave consent to have physical exam performed.    Physical Exam    Nursing note and vitals reviewed.  Constitutional: He appears well-developed and well-nourished.   HENT:   Head: Normocephalic and atraumatic.   Right Ear: External ear normal.   Left Ear: External ear normal.   Nose: Nose normal.   Mouth/Throat: Oropharynx is clear and moist.   Eyes: Conjunctivae and EOM are normal. Pupils are equal, round, and reactive to light.   Neck: Normal range of motion. Neck supple.   Cardiovascular: Normal rate, regular rhythm, normal heart sounds and intact distal pulses. Exam reveals no gallop and no friction rub.    No murmur heard.  Pulmonary/Chest: Breath sounds normal. No stridor. No respiratory distress. He has no wheezes. He has no rhonchi. He has no rales. He exhibits no tenderness.   Abdominal: Soft. Bowel sounds are normal. He exhibits no distension and no mass. There is no tenderness. There is no rebound and no guarding.   Genitourinary: Testes normal and penis normal. Right testis shows no mass, no swelling and no tenderness. Right testis is descended. Cremasteric reflex is not absent on the right side. Left testis shows no mass, no swelling and no tenderness. Left testis is descended. Cremasteric reflex is not absent on the left side. Circumcised. No penile erythema or penile tenderness. No discharge found.   Musculoskeletal: Normal range of motion.   Lymphadenopathy: No inguinal adenopathy noted on the right or left side.   Neurological: He is alert and oriented to person, place, and time. No cranial nerve deficit or sensory deficit. GCS score is 15. GCS eye subscore is 4. GCS verbal subscore is 5. GCS motor subscore is 6.   Skin: Skin is warm and dry. Capillary refill takes less  than 2 seconds. No rash noted.   Psychiatric: He has a normal mood and affect. His behavior is normal.         ED Course   Procedures  Labs Reviewed   POCT URINALYSIS W/O SCOPE - Abnormal; Notable for the following components:       Result Value    Glucose, UA Negative (*)     Bilirubin, UA Negative (*)     Ketones, UA Negative (*)     Blood, UA Negative (*)     Protein, UA Trace (*)     Nitrite, UA Negative (*)     Leukocytes, UA 1+ (*)     All other components within normal limits   C. TRACHOMATIS/N. GONORRHOEAE BY AMP DNA   POCT URINALYSIS W/O SCOPE               Medical Decision Making:   Clinical Tests:   Lab Tests: Ordered and Reviewed  ED Management:  Plan to order UA and screen for C. trachomatis/N. Gonorrhoeae. STD screen results pending.  Will empirically treat in ED with Rocephin and Azithromycin.    Chief complaint: STD exposure  Differential diagnosis: Chlamydia, gonorrhea, trichomoniasis, UTI  Treatment in the ED ceftriaxone, azithromycin, and physical exam.  Discussed treatment, prescriptions, and labs results.  Fill and take prescriptions as directed.  Return to the ED if symptoms worsen or do not resolve.   Answered questions and discussed discharge plan.    Patient feels better and is ready for discharge.  Follow up with PCP/specialist in 1 day.            Scribe Attestation:   Scribe #1: I performed the above scribed service and the documentation accurately describes the services I performed. I attest to the accuracy of the note.     I, Dr. Nikkie Montgomery, personally performed the services described in this documentation. This document was produced by a scribe under my direction and in my presence. All medical record entries made by the scribe were at my direction and in my presence.  I have reviewed the chart and agree that the record reflects my personal performance and is accurate and complete. Nikkie Montgomery, DO.     07/10/2019 1:57 PM             Clinical Impression:     1. Acute  urinary tract infection                                   Nikkie Montgomery,   07/10/19 6836

## 2019-07-11 LAB
C TRACH DNA SPEC QL NAA+PROBE: NOT DETECTED
N GONORRHOEA DNA SPEC QL NAA+PROBE: NOT DETECTED

## 2020-07-31 ENCOUNTER — OFFICE VISIT (OUTPATIENT)
Dept: PSYCHIATRY | Facility: CLINIC | Age: 30
End: 2020-07-31
Payer: MEDICARE

## 2020-07-31 VITALS
TEMPERATURE: 98 F | HEIGHT: 74 IN | DIASTOLIC BLOOD PRESSURE: 58 MMHG | HEART RATE: 80 BPM | BODY MASS INDEX: 18.6 KG/M2 | WEIGHT: 144.94 LBS | SYSTOLIC BLOOD PRESSURE: 102 MMHG

## 2020-07-31 DIAGNOSIS — F15.929 METHAMPHETAMINE INTOXICATION: ICD-10-CM

## 2020-07-31 DIAGNOSIS — F15.20 METHAMPHETAMINE USE DISORDER, SEVERE, DEPENDENCE: ICD-10-CM

## 2020-07-31 DIAGNOSIS — F12.20 CANNABIS USE DISORDER, SEVERE, DEPENDENCE: ICD-10-CM

## 2020-07-31 DIAGNOSIS — F19.94 SUBSTANCE INDUCED MOOD DISORDER: Primary | ICD-10-CM

## 2020-07-31 PROCEDURE — 99999 PR PBB SHADOW E&M-EST. PATIENT-LVL III: CPT | Mod: PBBFAC,,, | Performed by: PSYCHIATRY & NEUROLOGY

## 2020-07-31 PROCEDURE — 99214 OFFICE O/P EST MOD 30 MIN: CPT | Mod: S$PBB,,, | Performed by: PSYCHIATRY & NEUROLOGY

## 2020-07-31 PROCEDURE — 99214 PR OFFICE/OUTPT VISIT, EST, LEVL IV, 30-39 MIN: ICD-10-PCS | Mod: S$PBB,,, | Performed by: PSYCHIATRY & NEUROLOGY

## 2020-07-31 PROCEDURE — 99999 PR PBB SHADOW E&M-EST. PATIENT-LVL III: ICD-10-PCS | Mod: PBBFAC,,, | Performed by: PSYCHIATRY & NEUROLOGY

## 2020-07-31 RX ORDER — DIVALPROEX SODIUM 500 MG/1
500 TABLET, DELAYED RELEASE ORAL 2 TIMES DAILY
Qty: 180 TABLET | Refills: 1 | Status: SHIPPED | OUTPATIENT
Start: 2020-07-31

## 2020-07-31 RX ORDER — LAMOTRIGINE 25 MG/1
25 TABLET ORAL
COMMUNITY

## 2020-07-31 RX ORDER — MIRTAZAPINE 30 MG/1
30 TABLET, ORALLY DISINTEGRATING ORAL NIGHTLY
Qty: 90 TABLET | Refills: 1 | Status: SHIPPED | OUTPATIENT
Start: 2020-07-31

## 2020-07-31 RX ORDER — OLANZAPINE 20 MG/1
30 TABLET ORAL NIGHTLY
Qty: 90 TABLET | Refills: 1 | Status: SHIPPED | OUTPATIENT
Start: 2020-07-31

## 2020-07-31 RX ORDER — HYDROXYZINE PAMOATE 50 MG/1
50-100 CAPSULE ORAL EVERY 8 HOURS PRN
Qty: 120 CAPSULE | Refills: 5 | Status: SHIPPED | OUTPATIENT
Start: 2020-07-31

## 2020-07-31 RX ORDER — DIVALPROEX SODIUM 500 MG/1
500 TABLET, DELAYED RELEASE ORAL 2 TIMES DAILY
Qty: 90 TABLET | Refills: 1 | Status: SHIPPED | OUTPATIENT
Start: 2020-07-31 | End: 2020-07-31

## 2020-07-31 NOTE — PATIENT INSTRUCTIONS
"        You have been provided with a certain amount of medication with a specified number of refills.  Please follow up within an adequate time before you run out of medications.    REFILLS FOR CONTROLLED SUBSTANCES WILL NOT BE GIVEN WITHOUT AN APPOINTMENT.  I will not honor or fill automated refill requests from pharmacies.  You must come in for an appointment to get refills.        Please book your next appointment for myself or therapist by phone by calling our office at [phone].        Note that follow up appointments are 10-15 minutes long.  It is important that we focus on medication management.  Should you need therapy, please get set up with our therapist or call your insurance company to find out which therapists are available in your area.      PLEASE BE AT LEAST 15 MINUTES EARLY FOR YOUR NEXT APPOINTMENT.  Late arrivals WILL BE TURNED AWAY AND ASKED TO RESCHEDULE.  YOU MUST COME EARLY TO ALLOW TIME FOR CHECK-IN AS WELL AS GET YOUR VITAL SIGNS AND GO OVER YOUR MEDICATIONS.  [facility] is not fair to the patients who present after you and are on time for their appointments.  It causes a delay in the appointments for patients and staff.  YOU MAY ALSO BE DISCHARGED FROM CLINIC with multiple late arrivals or "No Show" appointments.       -----------------------------------------------------------------------------------------------------------------  IF YOU FEEL SUICIDAL OR HAVING THOUGHTS OR PLANS TO HURT YOURSELF OR OTHERS, CALL 911 OR REPORT TO THE NEAREST EMERGENCY ROOM.  YOU CAN ALSO ACCESS THE FOLLOWING HOTLINE:    National Suicide Hotline Number 3-884-934-TALK (4902)                  "
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
## 2021-04-28 ENCOUNTER — PATIENT MESSAGE (OUTPATIENT)
Dept: RESEARCH | Facility: HOSPITAL | Age: 31
End: 2021-04-28

## 2022-08-10 NOTE — ASSESSMENT & PLAN NOTE
The catheter was removed from the left atrium.  Undetermined if his long term mood irregularities is due to drug use or baseline mental illness of bipolar type.  He is not currently suicidal/homicidal/gravely disabled, no need to PEC and there is no need for acute inpatient psychiatric admission.  He is currently at his baseline.  Provide a 1 month prescription for Remeron 30mg (sol-tab) nightly, olanzapine 30mg nightly, depakote 500mg BID, and hydroxyzine 50-100mg TID PRN anxiety.  He is given my card so that he can call and get re-established back in my clinic.  Patient is allowed to use the phone to set up a discharge ride and plan.

## 2024-03-23 ENCOUNTER — HOSPITAL ENCOUNTER (EMERGENCY)
Facility: HOSPITAL | Age: 34
Discharge: HOME | End: 2024-03-23
Attending: EMERGENCY MEDICINE

## 2024-03-23 VITALS
WEIGHT: 144 LBS | BODY MASS INDEX: 18.48 KG/M2 | DIASTOLIC BLOOD PRESSURE: 59 MMHG | HEART RATE: 55 BPM | OXYGEN SATURATION: 100 % | SYSTOLIC BLOOD PRESSURE: 103 MMHG | RESPIRATION RATE: 17 BRPM | HEIGHT: 74 IN | TEMPERATURE: 98 F

## 2024-03-23 DIAGNOSIS — R10.9 RIGHT FLANK PAIN: Primary | ICD-10-CM

## 2024-03-23 LAB
ALBUMIN SERPL BCP-MCNC: 4.1 G/DL (ref 3.5–5.2)
ALP SERPL-CCNC: 57 U/L (ref 55–135)
ALT SERPL W/O P-5'-P-CCNC: 13 U/L (ref 10–44)
ANION GAP SERPL CALC-SCNC: 7 MMOL/L (ref 8–16)
AST SERPL-CCNC: 13 U/L (ref 10–40)
BASOPHILS # BLD AUTO: 0.06 K/UL (ref 0–0.2)
BASOPHILS NFR BLD: 1.1 % (ref 0–1.9)
BILIRUB SERPL-MCNC: 1.1 MG/DL (ref 0.1–1)
BILIRUB UR QL STRIP: NEGATIVE
BUN SERPL-MCNC: 16 MG/DL (ref 6–20)
CALCIUM SERPL-MCNC: 9.2 MG/DL (ref 8.7–10.5)
CHLORIDE SERPL-SCNC: 108 MMOL/L (ref 95–110)
CLARITY UR: CLEAR
CO2 SERPL-SCNC: 24 MMOL/L (ref 23–29)
COLOR UR: YELLOW
CREAT SERPL-MCNC: 0.6 MG/DL (ref 0.5–1.4)
DIFFERENTIAL METHOD BLD: ABNORMAL
EOSINOPHIL # BLD AUTO: 0.2 K/UL (ref 0–0.5)
EOSINOPHIL NFR BLD: 3.1 % (ref 0–8)
ERYTHROCYTE [DISTWIDTH] IN BLOOD BY AUTOMATED COUNT: 12.8 % (ref 11.5–14.5)
EST. GFR  (NO RACE VARIABLE): >60 ML/MIN/1.73 M^2
GLUCOSE SERPL-MCNC: 94 MG/DL (ref 70–110)
GLUCOSE UR QL STRIP: NEGATIVE
HCT VFR BLD AUTO: 38.4 % (ref 40–54)
HGB BLD-MCNC: 13.1 G/DL (ref 14–18)
HGB UR QL STRIP: NEGATIVE
IMM GRANULOCYTES # BLD AUTO: 0.01 K/UL (ref 0–0.04)
IMM GRANULOCYTES NFR BLD AUTO: 0.2 % (ref 0–0.5)
KETONES UR QL STRIP: NEGATIVE
LEUKOCYTE ESTERASE UR QL STRIP: NEGATIVE
LYMPHOCYTES # BLD AUTO: 1.6 K/UL (ref 1–4.8)
LYMPHOCYTES NFR BLD: 28.5 % (ref 18–48)
MCH RBC QN AUTO: 30.5 PG (ref 27–31)
MCHC RBC AUTO-ENTMCNC: 34.1 G/DL (ref 32–36)
MCV RBC AUTO: 90 FL (ref 82–98)
MONOCYTES # BLD AUTO: 0.5 K/UL (ref 0.3–1)
MONOCYTES NFR BLD: 9 % (ref 4–15)
NEUTROPHILS # BLD AUTO: 3.2 K/UL (ref 1.8–7.7)
NEUTROPHILS NFR BLD: 58.1 % (ref 38–73)
NITRITE UR QL STRIP: NEGATIVE
NRBC BLD-RTO: 0 /100 WBC
PH UR STRIP: 6 [PH] (ref 5–8)
PLATELET # BLD AUTO: 238 K/UL (ref 150–450)
PMV BLD AUTO: 9.1 FL (ref 9.2–12.9)
POTASSIUM SERPL-SCNC: 4.1 MMOL/L (ref 3.5–5.1)
PROT SERPL-MCNC: 6.5 G/DL (ref 6–8.4)
PROT UR QL STRIP: NEGATIVE
RBC # BLD AUTO: 4.29 M/UL (ref 4.6–6.2)
SODIUM SERPL-SCNC: 139 MMOL/L (ref 136–145)
SP GR UR STRIP: 1.02 (ref 1–1.03)
URN SPEC COLLECT METH UR: NORMAL
UROBILINOGEN UR STRIP-ACNC: NEGATIVE EU/DL
WBC # BLD AUTO: 5.43 K/UL (ref 3.9–12.7)

## 2024-03-23 PROCEDURE — 96361 HYDRATE IV INFUSION ADD-ON: CPT

## 2024-03-23 PROCEDURE — 63600175 PHARM REV CODE 636 W HCPCS: Performed by: NURSE PRACTITIONER

## 2024-03-23 PROCEDURE — 25000003 PHARM REV CODE 250: Performed by: NURSE PRACTITIONER

## 2024-03-23 PROCEDURE — 96374 THER/PROPH/DIAG INJ IV PUSH: CPT

## 2024-03-23 PROCEDURE — 80053 COMPREHEN METABOLIC PANEL: CPT | Performed by: NURSE PRACTITIONER

## 2024-03-23 PROCEDURE — 99285 EMERGENCY DEPT VISIT HI MDM: CPT | Mod: 25

## 2024-03-23 PROCEDURE — 96375 TX/PRO/DX INJ NEW DRUG ADDON: CPT

## 2024-03-23 PROCEDURE — 81003 URINALYSIS AUTO W/O SCOPE: CPT | Performed by: NURSE PRACTITIONER

## 2024-03-23 PROCEDURE — 85025 COMPLETE CBC W/AUTO DIFF WBC: CPT | Performed by: NURSE PRACTITIONER

## 2024-03-23 RX ORDER — HYDROMORPHONE HYDROCHLORIDE 1 MG/ML
1 INJECTION, SOLUTION INTRAMUSCULAR; INTRAVENOUS; SUBCUTANEOUS
Status: COMPLETED | OUTPATIENT
Start: 2024-03-23 | End: 2024-03-23

## 2024-03-23 RX ORDER — ONDANSETRON HYDROCHLORIDE 2 MG/ML
4 INJECTION, SOLUTION INTRAVENOUS
Status: COMPLETED | OUTPATIENT
Start: 2024-03-23 | End: 2024-03-23

## 2024-03-23 RX ORDER — DICLOFENAC SODIUM 50 MG/1
50 TABLET, DELAYED RELEASE ORAL 3 TIMES DAILY PRN
Qty: 20 TABLET | Refills: 2 | Status: SHIPPED | OUTPATIENT
Start: 2024-03-23

## 2024-03-23 RX ADMIN — ONDANSETRON 4 MG: 2 INJECTION INTRAMUSCULAR; INTRAVENOUS at 10:03

## 2024-03-23 RX ADMIN — SODIUM CHLORIDE 1000 ML: 9 INJECTION, SOLUTION INTRAVENOUS at 10:03

## 2024-03-23 RX ADMIN — HYDROMORPHONE HYDROCHLORIDE 1 MG: 1 INJECTION, SOLUTION INTRAMUSCULAR; INTRAVENOUS; SUBCUTANEOUS at 10:03

## 2024-03-23 NOTE — ED PROVIDER NOTES
"Encounter Date: 3/23/2024       History     Chief Complaint   Patient presents with    Flank Pain     R sided flank pain since yesterday, states that he "can feel the kidney stone moving." Hx of kidney stones.      Presents with complaint of right flank pain.  Denies fever nausea vomiting.  Onset this morning.  Denies injury.  He has a history of kidney stones.  Denies decreased urine output.  Denies difficulty urinating.      Review of patient's allergies indicates:   Allergen Reactions    Lithium analogues Hives    Quetiapine Shortness Of Breath and Swelling     Other reaction(s): Hives    Latex      Other reaction(s): Hives    Strattera  [atomoxetine]      Other reaction(s): Hives     Past Medical History:   Diagnosis Date    Behavioral problem     History of psychiatric hospitalization     2 times    Hx of psychiatric care     Psychiatric problem     Suicide attempt     cutting wrists    Therapy      No past surgical history on file.  Family History   Adopted: Yes   Problem Relation Age of Onset    Bipolar disorder Mother      Social History     Tobacco Use    Smokeless tobacco: Never    Tobacco comments:     packs "vary a day."    Substance Use Topics    Alcohol use: Yes     Comment: frequent    Drug use: Yes     Types: Marijuana, Cocaine, Methamphetamines     Comment: frequent use     Review of Systems   Constitutional:  Negative for fever.   Respiratory:  Negative for cough, shortness of breath and wheezing.    Cardiovascular:  Negative for chest pain, palpitations and leg swelling.   Gastrointestinal:  Negative for abdominal pain, diarrhea, nausea and vomiting.   Genitourinary:  Positive for flank pain. Negative for decreased urine volume, difficulty urinating, dysuria, frequency, hematuria, penile pain, penile swelling, scrotal swelling, testicular pain and urgency.   Musculoskeletal:  Negative for back pain.   Skin:  Negative for rash.   Neurological:  Negative for weakness.       Physical Exam     Initial " Vitals [03/23/24 0941]   BP Pulse Resp Temp SpO2   107/72 72 18 97.7 °F (36.5 °C) 99 %      MAP       --         Physical Exam    Constitutional: He appears well-developed and well-nourished.   HENT:   Head: Normocephalic.   Mouth/Throat: Oropharynx is clear and moist.   Eyes: Conjunctivae are normal.   Neck: Neck supple.   Normal range of motion.  Cardiovascular:  Normal rate and regular rhythm.           Pulmonary/Chest: Breath sounds normal.   Abdominal: Abdomen is soft. Bowel sounds are normal. He exhibits no distension. There is no abdominal tenderness. There is no rebound and no guarding.   Genitourinary: No discharge found.    Genitourinary Comments: Negative CVA tenderness right or left flank.     Musculoskeletal:         General: Normal range of motion.      Cervical back: Normal range of motion and neck supple.      Comments: Patient is ambulatory per self his gait is steady.     Neurological: He is alert and oriented to person, place, and time. No sensory deficit. GCS score is 15. GCS eye subscore is 4. GCS verbal subscore is 5. GCS motor subscore is 6.   Skin: Skin is warm. Capillary refill takes less than 2 seconds.   Psychiatric: He has a normal mood and affect. Thought content normal.         ED Course   Procedures  Labs Reviewed   CBC W/ AUTO DIFFERENTIAL - Abnormal; Notable for the following components:       Result Value    RBC 4.29 (*)     Hemoglobin 13.1 (*)     Hematocrit 38.4 (*)     MPV 9.1 (*)     All other components within normal limits   COMPREHENSIVE METABOLIC PANEL - Abnormal; Notable for the following components:    Total Bilirubin 1.1 (*)     Anion Gap 7 (*)     All other components within normal limits   URINALYSIS, REFLEX TO URINE CULTURE    Narrative:     Specimen Source->Urine          Imaging Results              CT Renal Stone Study ABD Pelvis WO (Final result)  Result time 03/23/24 10:50:06      Final result by Arnold Nina Jr., MD (03/23/24 10:50:06)                    Narrative:    EXAMINATION:  CT RENAL STONE STUDY ABD PELVIS WO    CLINICAL INDICATION: Male, 33 years old. Flank pain, kidney stone suspected    TECHNIQUE:  CT scan examination of the abdomen and pelvis is performed from the domes of the diaphragm to the pubic symphysis without administration of intravenous contrast material.      CONTRAST: None    COMPARISON: None    FINDINGS:  Lower Chest: Visualized lung bases are clear. Heart is normal in size. No pericardial or pleural effusion.  Liver: Normal in size and contour. No focal lesion.  Bile Ducts: Normal caliber.  Gallbladder: No stones, wall thickening, or pericholecystic fluid.  Pancreas: Normal appearance without focal lesion.  Spleen: Normal in size and contour.  Adrenals: Normal configuration.  Kidneys and Ureters: Normal size and contour. No hydronephrosis.  Bladder: Normal in appearance.  Stomach: Unremarkable.  Small Intestine: Unremarkable.  Appendix: No inflammatory changes.  Colon: Unremarkable.  Vessels: Abdominal aorta and inferior vena cava are normal in course and caliber.  Reproductive Organs:  Lymph Nodes: No pathologic mesenteric or retroperitoneal lymph nodes.  Peritoneum: No free air, free fluid, or fluid collection.  Abdominal Wall: No hernia or mass.  Musculoskeletal: No acute abnormality or suspicious bony lesion.    IMPRESSION:  1.No acute or significant abnormality seen in the abdomen or pelvis.  2. No CT evidence of obstructing genitourinary pathology.    This exam was performed according to our departmental dose-optimization program which includes automated exposure control, adjustment of the mA and/or kV according to patient size and/or use of iterative reconstruction technique.    Electronically signed by:  Arnold Nina MD  03/23/2024 10:50 AM CDT Workstation: [id number]                                     Medications   HYDROmorphone injection 1 mg (1 mg Intravenous Given 3/23/24 1023)   ondansetron injection 4 mg (4 mg Intravenous  Given 3/23/24 1023)   sodium chloride 0.9% bolus 1,000 mL 1,000 mL (0 mLs Intravenous Stopped 3/23/24 1127)     Medical Decision Making  Presents with complaint of right flank pain.  Onset yesterday.  Patient denies fever nausea vomiting or diarrhea.    Amount and/or Complexity of Data Reviewed  Labs: ordered.     Details: Labs are within normal limits for this patient.  Radiology: ordered.     Details: His CT scan is negative for stones.  Discussion of management or test interpretation with external provider(s): Patient was given a L of normal saline.  He was also given Dilaudid 1 mg and Zofran 4 mg IV.  This has controlled his pain.  When I went in to give this patient his results of his CT scan, he informed me that he is at [facility].  When I told him he was not supposed to have narcotics and should have told me that from the onset he denied knowing bed information.  I explained to him that he was well aware that information as [facility]  When I asked him what he was [facility] he was very evasive he told me he was there because he was homeless.  I told him that was not a reason to be there and they would not taking for that reason.  I informed him that he had have some sort of a dependency on alcohol or drugs.  He then tell me that he had a dependency on methamphetamines but that he had been cleaned for 9 months.  He has been residing at [facility] for 7-1/2 months.  I looked back on his records and in July of last year he had psychosis from substance abuse.  Meth is his drug of choice.  This patient did not tell me the truth.  I saw him sitting out in the waiting room waiting for his ride.  Was in absolutely no distress whatsoever.    Risk  Prescription drug management.                                      Clinical Impression:  Final diagnoses:  [R10.9] Right flank pain (Primary)          ED Disposition Condition    Discharge Stable           ED Prescriptions       Medication Sig Dispense Start Date End Date Auth. Provider    diclofenac (VOLTAREN) 50 MG EC tablet Take 1 tablet (50 mg total) by mouth 3 (three) times daily as needed. 20 tablet 3/23/2024 -- Lacey Holland NP          Follow-up Information       Follow up With Specialties Details Why Contact Info    [facility]  In 3 days  [address] [address]  [phone]               Lacey Holland NP  03/23/24 9702

## 2024-03-23 NOTE — DISCHARGE INSTRUCTIONS
Take your medication as prescribed return to the ED for any worsening symptoms or any other concerns